# Patient Record
(demographics unavailable — no encounter records)

---

## 2025-07-22 NOTE — PLAN
[FreeTextEntry1] : 66 y/o M for f/u SM visit. 1. Call ortho specialist for arthritis and PT (referred by his PCP). 2. [facility] referral for dermatologist. (family hx of skin cancer and for cyst on back of neck). 3. [facility] referral for endocrinology. 4. C/w current medications as prescribed. 5. Increase hydration. 6. C/w [facility] for housing placement options. 7. GI appointment coming up to discuss colonoscopy and endoscopy. 8. Sober since March-c/w plan of care at [facility].

## 2025-07-22 NOTE — REVIEW OF SYSTEMS
[Negative] : Constitutional [FreeTextEntry3] : recent eye exam [FreeTextEntry6] : cough likely viral (non productive) [FreeTextEntry9] : B/L knee pain-reports right knee injury several days ago [de-identified] : diabetic neuropathy; period of memory lapses [de-identified] : depression [de-identified] : Type II DM-insulin dependent

## 2025-07-22 NOTE — ASSESSMENT
[FreeTextEntry1] : F/u visit. Residing in a SAFE house at this time. Was denied [facility] housing he had interviewed for. Remains going to [facility] 3x/week for his history of alcoholism. He has an appointment on Friday to find out if he is eligible for [email or web address] voucher that he may receive because of his time in the .  Feeling well overall. Well connected with his PCP at [facility] in [place]. Requesting endocrine referral. Reports recent eye exam which was WNL (at [facility]).   Reports he has been having episodes of severe nausea 4-5 x/day. States he did speak to his PCP and was prescribed Zofran ODT which provides some relief. Denies vomiting or pain with episodes.   Relays he is a marijuana user and is attempting to stop starting today. Discussed how the marijuana may be contributing to the nausea.

## 2025-07-22 NOTE — PHYSICAL EXAM
[No Acute Distress] : no acute distress [Well Nourished] : well nourished [No Respiratory Distress] : no respiratory distress  [Clear to Auscultation] : lungs were clear to auscultation bilaterally [Normal Rate] : normal rate  [Regular Rhythm] : with a regular rhythm [Normal S1, S2] : normal S1 and S2 [Soft] : abdomen soft [Non Tender] : non-tender [Normal Affect] : the affect was normal [Normal Insight/Judgement] : insight and judgment were intact [de-identified] : cyst to back of neck [de-identified] : right knee edema & pain [de-identified] : forgetful